# Patient Record
Sex: FEMALE | Race: WHITE | NOT HISPANIC OR LATINO | Employment: FULL TIME | URBAN - METROPOLITAN AREA
[De-identification: names, ages, dates, MRNs, and addresses within clinical notes are randomized per-mention and may not be internally consistent; named-entity substitution may affect disease eponyms.]

---

## 2019-02-08 ENCOUNTER — TELEPHONE (OUTPATIENT)
Dept: HEALTH INFORMATION MANAGEMENT | Facility: OTHER | Age: 39
End: 2019-02-08

## 2019-02-14 ENCOUNTER — OFFICE VISIT (OUTPATIENT)
Dept: MEDICAL GROUP | Facility: MEDICAL CENTER | Age: 39
End: 2019-02-14

## 2019-02-14 VITALS
TEMPERATURE: 98.3 F | WEIGHT: 138 LBS | BODY MASS INDEX: 22.99 KG/M2 | SYSTOLIC BLOOD PRESSURE: 118 MMHG | OXYGEN SATURATION: 98 % | HEIGHT: 65 IN | HEART RATE: 78 BPM | DIASTOLIC BLOOD PRESSURE: 76 MMHG | RESPIRATION RATE: 16 BRPM

## 2019-02-14 DIAGNOSIS — Z12.31 SCREENING MAMMOGRAM, ENCOUNTER FOR: ICD-10-CM

## 2019-02-14 DIAGNOSIS — Z23 NEED FOR INFLUENZA VACCINATION: ICD-10-CM

## 2019-02-14 DIAGNOSIS — Z00.00 ANNUAL PHYSICAL EXAM: ICD-10-CM

## 2019-02-14 PROBLEM — Z86.19 HISTORY OF HPV INFECTION: Status: ACTIVE | Noted: 2019-02-14

## 2019-02-14 PROCEDURE — 90471 IMMUNIZATION ADMIN: CPT | Performed by: NURSE PRACTITIONER

## 2019-02-14 PROCEDURE — 90686 IIV4 VACC NO PRSV 0.5 ML IM: CPT | Performed by: NURSE PRACTITIONER

## 2019-02-14 PROCEDURE — 99386 PREV VISIT NEW AGE 40-64: CPT | Mod: 25 | Performed by: NURSE PRACTITIONER

## 2019-02-14 ASSESSMENT — PATIENT HEALTH QUESTIONNAIRE - PHQ9: CLINICAL INTERPRETATION OF PHQ2 SCORE: 0

## 2019-02-14 NOTE — ASSESSMENT & PLAN NOTE
Social/Family: , 4 children  Work: , full time  Diet: Vegetables daily, rare red meat. Lean meats. Rare fish. Moderate carbs. Sugary sweets daily.   Caffeine/Energy Drinks/Soda: Coffee 3 cups daily, no soda or energy drinks  Exercise: Runs 3 times per week, resistance  Stress: Normal life stress  Sleep: No concerns  Depression/Anxiety Concerns: Not currently

## 2019-02-15 NOTE — PROGRESS NOTES
"Deep Nguyen is a 38 y.o. female here to establish care and for annual physical:    HPI:   Annual physical exam  Social/Family: , 4 children  Work: , full time  Diet: Vegetables daily, rare red meat. Lean meats. Rare fish. Moderate carbs. Sugary sweets daily.   Caffeine/Energy Drinks/Soda: Coffee 3 cups daily, no soda or energy drinks  Exercise: Runs 3 times per week, resistance  Stress: Normal life stress  Sleep: No concerns  Depression/Anxiety Concerns: Not currently      Current medicines (including changes today)  No current outpatient prescriptions on file.     No current facility-administered medications for this visit.      She  has no past medical history on file.  She  has a past surgical history that includes acl repair.  Social History   Substance Use Topics   • Smoking status: Never Smoker   • Smokeless tobacco: Never Used   • Alcohol use Yes      Comment: wine and beer, 1-2 drinks 5 nights per week     Social History     Social History Narrative   • No narrative on file     Family History   Problem Relation Age of Onset   • Hyperlipidemia Mother    • Alcohol/Drug Father    • No Known Problems Sister      Family Status   Relation Status   • Mo Alive   • Fa    • Sis Alive         ROS  No chest pain, no abdominal pain, no rash.  Positive ROS as per HPI.  All other systems reviewed and are negative      Objective:     Blood pressure 118/76, pulse 78, temperature 36.8 °C (98.3 °F), temperature source Temporal, resp. rate 16, height 1.651 m (5' 5\"), weight 62.6 kg (138 lb), last menstrual period 02/10/2019, SpO2 98 %. Body mass index is 22.96 kg/m².     Physical Exam:    Constitutional: Alert, no distress.  Skin: Warm, dry, good turgor, no rashes in visible areas.  Eye: Equal, round and reactive, conjunctiva clear, lids normal.  ENMT: Lips without lesions, good dentition, oropharynx clear.  Neck: Trachea midline, no masses, no thyromegaly. No cervical or supraclavicular " lymphadenopathy.  Respiratory: Unlabored respiratory effort, lungs clear to auscultation, no wheezes, no ronchi.  Cardiovascular: Normal S1, S2, no murmur, no edema.  Psych: Alert and oriented x3, normal affect and mood.      Assessment and Plan:   The following treatment plan was discussed    1. Annual physical exam  Patient and I discussed the importance of lifestyle changes, with particular emphasis on decreasing sugar and carbohydrate intake and increasing plant-based nutrition (for the purposes of weight loss, general health, and prevention of chronic illnesses), as well as regular cardiovascular exercise, proper sleep, and stress management. Patient verbalized understanding.  Check labs, call with results  - CBC WITH DIFFERENTIAL; Future  - Comp Metabolic Panel; Future  - Lipid Profile; Future  - TSH WITH REFLEX TO FT4; Future  - VITAMIN D,25 HYDROXY; Future    2. Need for influenza vaccination  - Influenza Vaccine Quad Injection >3Y (PF)    3. Screening mammogram, encounter for  - MA-SCREEN MAMMO W/CAD-BILAT; Future      Followup: Return in about 3 months (around 5/14/2019) for Pap Smear, Review Labs.    I have placed the below orders and discussed them with an approved delegating provider. The MA is performing the below orders under the direction of Dr. Lockwood

## 2019-07-31 ENCOUNTER — HOSPITAL ENCOUNTER (OUTPATIENT)
Dept: RADIOLOGY | Facility: MEDICAL CENTER | Age: 39
End: 2019-07-31
Attending: NURSE PRACTITIONER
Payer: COMMERCIAL

## 2019-07-31 DIAGNOSIS — Z12.31 SCREENING MAMMOGRAM, ENCOUNTER FOR: ICD-10-CM

## 2019-08-05 ENCOUNTER — HOSPITAL ENCOUNTER (OUTPATIENT)
Dept: RADIOLOGY | Facility: MEDICAL CENTER | Age: 39
End: 2019-08-05
Attending: NURSE PRACTITIONER
Payer: COMMERCIAL

## 2019-08-05 DIAGNOSIS — N64.4 BREAST PAIN: ICD-10-CM

## 2019-08-05 PROCEDURE — 76642 ULTRASOUND BREAST LIMITED: CPT | Mod: RT

## 2019-08-05 PROCEDURE — 77066 DX MAMMO INCL CAD BI: CPT

## 2019-08-30 ENCOUNTER — HOSPITAL ENCOUNTER (OUTPATIENT)
Facility: MEDICAL CENTER | Age: 39
End: 2019-08-30
Attending: NURSE PRACTITIONER
Payer: COMMERCIAL

## 2019-08-30 ENCOUNTER — OFFICE VISIT (OUTPATIENT)
Dept: MEDICAL GROUP | Facility: MEDICAL CENTER | Age: 39
End: 2019-08-30
Payer: COMMERCIAL

## 2019-08-30 VITALS
HEART RATE: 102 BPM | HEIGHT: 65 IN | DIASTOLIC BLOOD PRESSURE: 72 MMHG | OXYGEN SATURATION: 96 % | TEMPERATURE: 98.4 F | WEIGHT: 133 LBS | BODY MASS INDEX: 22.16 KG/M2 | SYSTOLIC BLOOD PRESSURE: 110 MMHG

## 2019-08-30 DIAGNOSIS — Z86.19 HISTORY OF HPV INFECTION: ICD-10-CM

## 2019-08-30 DIAGNOSIS — Z30.011 ENCOUNTER FOR INITIAL PRESCRIPTION OF CONTRACEPTIVE PILLS: ICD-10-CM

## 2019-08-30 DIAGNOSIS — Z12.4 PAP SMEAR FOR CERVICAL CANCER SCREENING: ICD-10-CM

## 2019-08-30 PROCEDURE — 88175 CYTOPATH C/V AUTO FLUID REDO: CPT

## 2019-08-30 PROCEDURE — 87624 HPV HI-RISK TYP POOLED RSLT: CPT

## 2019-08-30 PROCEDURE — 99396 PREV VISIT EST AGE 40-64: CPT | Performed by: NURSE PRACTITIONER

## 2019-08-30 RX ORDER — LEVONORGESTREL AND ETHINYL ESTRADIOL 0.1-0.02MG
1 KIT ORAL DAILY
Qty: 28 TAB | Refills: 11 | Status: SHIPPED | OUTPATIENT
Start: 2019-08-30 | End: 2021-03-05 | Stop reason: SDUPTHER

## 2019-08-30 NOTE — PROGRESS NOTES
SUBJECTIVE: 39 y.o. female for annual routine gynecologic exam  Chief Complaint   Patient presents with   • Annual Exam       OB History    Para Term  AB Living   5 4 0 0 1 0   SAB TAB Ectopic Molar Multiple Live Births   1 0 0 0 0 0      Last Pap: 4 years ago  Social History     Substance and Sexual Activity   Sexual Activity Yes   • Partners: Male     Sexual history: currently sexually active, single partner, contraceptive--none   H/O Abnormal Pap yes, last pap HPV +  She  reports that she has never smoked. She has never used smokeless tobacco.        Allergies: Patient has no known allergies.     ROS:    Reports no menopause symptoms of hot flashes, night sweats, sleep disruption, mood changes.Denies vaginal dryness.   Menses every month with 3-7 days light, heavy bleeding   Cramping is no.   She does not take OTC analgesics for cramping  No significant bloating/fluid retention, pelvic pain, or dyspareunia. No vaginal discharge   No breast tenderness, dimpling, mass, nipple discharge, skin changes, changes in size or contour, or abnormal cyclic discomfort.  No urinary tract symptoms, no incontinence, no polydipsia, polyuria,  No abdominal pain, change in bowel habits, black or bloody stools.    No unusual headaches, no visual changes, menstrual migraines   No prolonged cough. No dyspnea or chest pain on exertion.  No depression, labile mood, anxiety, libido changes, insomnia.  No temperature intolerance.  No new/concerning skin lesions, concerns.     Exercise: moderate regular exercise program  Preventive Care:Up to date, labs due    Current medicines (including changes today)  No current outpatient medications on file.     No current facility-administered medications for this visit.      She  has no past medical history on file.  She  has a past surgical history that includes acl repair.     Family History:   Family History   Problem Relation Age of Onset   • Hyperlipidemia Mother    • Alcohol/Drug  "Father    • No Known Problems Sister        OBJECTIVE:   /72 (BP Location: Right arm, Patient Position: Sitting, BP Cuff Size: Adult)   Pulse (!) 102   Temp 36.9 °C (98.4 °F) (Temporal)   Ht 1.651 m (5' 5\")   Wt 60.3 kg (133 lb)   SpO2 96%   BMI 22.13 kg/m²   Body mass index is 22.13 kg/m².    General/Constitutional: Vitals as above, Well nourished, well developed female in no acute distress  HEAD AND NECK:  Ears normal.  Throat, oral cavity and tongue normal.  Neck supple. No adenopathy or masses in the neck or supraclavicular regions.  No carotid bruits. No thyromegaly.   PSYCH: Judgment grossly appropriate, no apparent depression/anxiety  NEURO: Cranial nerves are normal. DTR's normal and symmetric.    CHEST:  Clear, good air entry, no wheezes or rales.   HEART:  Regular rate and rhythm.  S1 and S2 normal.  No edema or JVD. ABDOMEN:  Soft without tenderness, guarding, mass or organomegaly.  No CVA tenderness or inguinal adenopathy.   Genitourinary: Grossly normal external female genitalia with skin within normal limits,   EXTREMITIES:  Extremities, reflexes and peripheral pulses are normal.   SKIN: color normal, vascularity normal, no edema, temperature normal   No rashes or suspicious skin lesions noted.     Breast Exam: deferred, recent mammogram done.   Pelvic Exam -  Normal external genitalia with no lesions. Vaginal Mucosa:  normal vaginal mucosa, normal discharge . no adnexal masses or tenderness, no cervical motion tenderness, no uterine tenderness, small speculum used with warm water as lubrication, Cervix well visualized with no discharge/friability/bleeding, cervical broom used to obtain cervical specimen, post-specimen collection demonstrated no bleeding, Cervix with no visible lesions. Thin Prep Pap is obtained, vaginal swab is not obtained and specimen(s) sent to lab  Rectal: deferred    <ASSESSMENT and PLAN>  1. Pap smear for cervical cancer screening  Pap complete, call with results "   2. Encounter for initial prescription of contraceptive pills   OCP sent to pharmacy.  levonorgestrel-ethinyl estradiol (AVIANE, MARCI, LESSINA) 0.1-20 MG-MCG per tablet   3. History of HPV infection         Discussed  mammography screening, use and side effects of OCP's, family planning choices, diet and exercise   Follow-up in 5 years for next Gyn exam and 5 years for next Pap, unless abnormal.   Next office visit for recheck of chronic medical conditions is due in  1 year

## 2019-08-30 NOTE — ASSESSMENT & PLAN NOTE
Patient is currently not using any preventative measures to prevent pregnancy.  She was previously on oral contraceptives but has not been since she had her last child about 4 years ago.  She would like to restart OCP today to prevent further pregnancies.  She denies any personal or family history of breast cancer, blood clots or clotting disorders, migraine headaches. She is a non smoker.

## 2019-09-03 LAB
CYTOLOGY REG CYTOL: NORMAL
HPV HR 12 DNA CVX QL NAA+PROBE: NEGATIVE
HPV16 DNA SPEC QL NAA+PROBE: NEGATIVE
HPV18 DNA SPEC QL NAA+PROBE: NEGATIVE
SPECIMEN SOURCE: NORMAL

## 2019-09-10 ENCOUNTER — TELEPHONE (OUTPATIENT)
Dept: MEDICAL GROUP | Facility: MEDICAL CENTER | Age: 39
End: 2019-09-10

## 2019-09-10 NOTE — TELEPHONE ENCOUNTER
----- Message from LAURA Bhagat sent at 9/5/2019  1:34 PM PDT -----  Please notify patient that the Pap smear was normal. Next pap smear is due in 5 years.    LAURA Bhagat

## 2021-02-24 ENCOUNTER — PATIENT OUTREACH (OUTPATIENT)
Dept: MEDICAL GROUP | Facility: MEDICAL CENTER | Age: 41
End: 2021-02-24

## 2021-02-24 NOTE — PROGRESS NOTES
ANNUAL WELLNESS VISIT PRE-VISIT PLANNING    1.  Reviewed notes from the last office visit: Yes    2.  If any orders were ordered or intended to be done prior to visit (i.e. 6 mos follow-up), do we have results/consult notes or has patient scheduled?        •  Labs - Labs ordered, completed on 08/30/2019 and results are in chart.  Note: If patient appointment is for lab review and patient did not complete labs, check with provider if OK to reschedule patient until labs completed.       •  Imaging - Imaging ordered, completed and results are in chart.       •  Referrals - No referrals were ordered at last office visit.    3.  Immunizations were updated in Epic using Reconcile Outside Information activity? Yes       •  Is patient due for Tdap? YES. Patient was not notified of copay/out of pocket cost.       •  Is patient due for Shingrix? NO    4.  Patient is due for the following Health Maintenance Topics:   Health Maintenance Due   Topic Date Due   • IMM DTaP/Tdap/Td Vaccine (2 - Td) 05/30/2020   • MAMMOGRAM  08/05/2020   • IMM INFLUENZA (1) 09/01/2020     5.  Reviewed/Updated the following:       •   Preferred Pharmacy? Yes       •   Preferred Lab? Yes       •   Preferred Communication? Yes       •   Allergies? Yes       •   Medications? YES. Was Abstract Encounter opened and chart updated? YES       •   Social History? Yes       •   Family History (document living status of immediate family members and if + hx of  cancer, diabetes, hypertension, hyperlipidemia, heart attack, stroke) No    6.  Care Team Updated:       •   DME Company (gait device, O2, CPAP, etc.): N\A       •   Other Specialists (eye doctor, derm, GYN, cardiology, endo, etc): N\A    7.  Patient was not advised: “This is a free wellness visit. The provider will screen for medical conditions to help you stay healthy. If you have other concerns to address you may be asked to discuss these at a separate visit or there may be an additional fee.”     8.   AHA (Puls8) form printed for Provider? N/A       Patient NOT contacted to complete AWV PVP. Information reviewed in chart.   Outside information NOT reconciled using the From The Bench feature. Per Katarzyna Solano, the From The Bench feature is down as of 02/09/2021 at 2:00pm. Will reconcile outside information at a later date.

## 2021-03-01 ENCOUNTER — APPOINTMENT (OUTPATIENT)
Dept: URGENT CARE | Facility: CLINIC | Age: 41
End: 2021-03-01
Payer: COMMERCIAL

## 2021-03-01 ENCOUNTER — HOSPITAL ENCOUNTER (EMERGENCY)
Facility: MEDICAL CENTER | Age: 41
End: 2021-03-01
Attending: EMERGENCY MEDICINE
Payer: COMMERCIAL

## 2021-03-01 ENCOUNTER — APPOINTMENT (OUTPATIENT)
Dept: RADIOLOGY | Facility: MEDICAL CENTER | Age: 41
End: 2021-03-01
Attending: EMERGENCY MEDICINE
Payer: COMMERCIAL

## 2021-03-01 VITALS
WEIGHT: 144.62 LBS | TEMPERATURE: 98.5 F | OXYGEN SATURATION: 97 % | SYSTOLIC BLOOD PRESSURE: 143 MMHG | HEIGHT: 65 IN | DIASTOLIC BLOOD PRESSURE: 91 MMHG | HEART RATE: 72 BPM | BODY MASS INDEX: 24.1 KG/M2 | RESPIRATION RATE: 16 BRPM

## 2021-03-01 DIAGNOSIS — O03.9 SPONTANEOUS MISCARRIAGE: ICD-10-CM

## 2021-03-01 DIAGNOSIS — R19.7 DIARRHEA, UNSPECIFIED TYPE: ICD-10-CM

## 2021-03-01 LAB
ALBUMIN SERPL BCP-MCNC: 4.5 G/DL (ref 3.2–4.9)
ALBUMIN/GLOB SERPL: 1.6 G/DL
ALP SERPL-CCNC: 76 U/L (ref 30–99)
ALT SERPL-CCNC: 12 U/L (ref 2–50)
ANION GAP SERPL CALC-SCNC: 9 MMOL/L (ref 7–16)
APPEARANCE UR: CLEAR
AST SERPL-CCNC: 18 U/L (ref 12–45)
B-HCG SERPL-ACNC: 40.7 MIU/ML (ref 0–10)
BACTERIA #/AREA URNS HPF: ABNORMAL /HPF
BASOPHILS # BLD AUTO: 0.4 % (ref 0–1.8)
BASOPHILS # BLD: 0.03 K/UL (ref 0–0.12)
BILIRUB SERPL-MCNC: 0.3 MG/DL (ref 0.1–1.5)
BILIRUB UR QL STRIP.AUTO: NEGATIVE
BUN SERPL-MCNC: 10 MG/DL (ref 8–22)
CALCIUM SERPL-MCNC: 9.5 MG/DL (ref 8.4–10.2)
CHLORIDE SERPL-SCNC: 105 MMOL/L (ref 96–112)
CO2 SERPL-SCNC: 25 MMOL/L (ref 20–33)
COLOR UR: YELLOW
CREAT SERPL-MCNC: 0.81 MG/DL (ref 0.5–1.4)
EOSINOPHIL # BLD AUTO: 0.05 K/UL (ref 0–0.51)
EOSINOPHIL NFR BLD: 0.7 % (ref 0–6.9)
EPI CELLS #/AREA URNS HPF: ABNORMAL /HPF
ERYTHROCYTE [DISTWIDTH] IN BLOOD BY AUTOMATED COUNT: 40.4 FL (ref 35.9–50)
GLOBULIN SER CALC-MCNC: 2.9 G/DL (ref 1.9–3.5)
GLUCOSE SERPL-MCNC: 97 MG/DL (ref 65–99)
GLUCOSE UR STRIP.AUTO-MCNC: NEGATIVE MG/DL
HCG SERPL QL: POSITIVE
HCT VFR BLD AUTO: 40.3 % (ref 37–47)
HGB BLD-MCNC: 14 G/DL (ref 12–16)
IMM GRANULOCYTES # BLD AUTO: 0.02 K/UL (ref 0–0.11)
IMM GRANULOCYTES NFR BLD AUTO: 0.3 % (ref 0–0.9)
KETONES UR STRIP.AUTO-MCNC: NEGATIVE MG/DL
LEUKOCYTE ESTERASE UR QL STRIP.AUTO: NEGATIVE
LIPASE SERPL-CCNC: 26 U/L (ref 7–58)
LYMPHOCYTES # BLD AUTO: 1.88 K/UL (ref 1–4.8)
LYMPHOCYTES NFR BLD: 27.6 % (ref 22–41)
MAGNESIUM SERPL-MCNC: 2.1 MG/DL (ref 1.5–2.5)
MCH RBC QN AUTO: 32.7 PG (ref 27–33)
MCHC RBC AUTO-ENTMCNC: 34.7 G/DL (ref 33.6–35)
MCV RBC AUTO: 94.2 FL (ref 81.4–97.8)
MICRO URNS: ABNORMAL
MONOCYTES # BLD AUTO: 0.43 K/UL (ref 0–0.85)
MONOCYTES NFR BLD AUTO: 6.3 % (ref 0–13.4)
MUCOUS THREADS #/AREA URNS HPF: ABNORMAL /HPF
NEUTROPHILS # BLD AUTO: 4.41 K/UL (ref 2–7.15)
NEUTROPHILS NFR BLD: 64.7 % (ref 44–72)
NITRITE UR QL STRIP.AUTO: NEGATIVE
NRBC # BLD AUTO: 0 K/UL
NRBC BLD-RTO: 0 /100 WBC
NUMBER OF RH DOSES IND 8505RD: NORMAL
PH UR STRIP.AUTO: 5.5 [PH] (ref 5–8)
PLATELET # BLD AUTO: 288 K/UL (ref 164–446)
PMV BLD AUTO: 9.8 FL (ref 9–12.9)
POTASSIUM SERPL-SCNC: 3.8 MMOL/L (ref 3.6–5.5)
PROT SERPL-MCNC: 7.4 G/DL (ref 6–8.2)
PROT UR QL STRIP: NEGATIVE MG/DL
RBC # BLD AUTO: 4.28 M/UL (ref 4.2–5.4)
RBC UR QL AUTO: ABNORMAL
RH BLD: NORMAL
SODIUM SERPL-SCNC: 139 MMOL/L (ref 135–145)
SP GR UR STRIP.AUTO: 1.02
UNIDENT CRYS URNS QL MICRO: ABNORMAL /HPF
WBC # BLD AUTO: 6.8 K/UL (ref 4.8–10.8)
WBC #/AREA URNS HPF: ABNORMAL /HPF

## 2021-03-01 PROCEDURE — 76801 OB US < 14 WKS SINGLE FETUS: CPT

## 2021-03-01 PROCEDURE — A9270 NON-COVERED ITEM OR SERVICE: HCPCS | Performed by: EMERGENCY MEDICINE

## 2021-03-01 PROCEDURE — 84702 CHORIONIC GONADOTROPIN TEST: CPT

## 2021-03-01 PROCEDURE — 81001 URINALYSIS AUTO W/SCOPE: CPT

## 2021-03-01 PROCEDURE — 83735 ASSAY OF MAGNESIUM: CPT

## 2021-03-01 PROCEDURE — 80053 COMPREHEN METABOLIC PANEL: CPT

## 2021-03-01 PROCEDURE — 99284 EMERGENCY DEPT VISIT MOD MDM: CPT

## 2021-03-01 PROCEDURE — 85025 COMPLETE CBC W/AUTO DIFF WBC: CPT

## 2021-03-01 PROCEDURE — 83690 ASSAY OF LIPASE: CPT

## 2021-03-01 PROCEDURE — 700102 HCHG RX REV CODE 250 W/ 637 OVERRIDE(OP): Performed by: EMERGENCY MEDICINE

## 2021-03-01 PROCEDURE — 86901 BLOOD TYPING SEROLOGIC RH(D): CPT

## 2021-03-01 PROCEDURE — 84703 CHORIONIC GONADOTROPIN ASSAY: CPT

## 2021-03-01 PROCEDURE — 36415 COLL VENOUS BLD VENIPUNCTURE: CPT

## 2021-03-01 RX ORDER — DIPHENOXYLATE HYDROCHLORIDE AND ATROPINE SULFATE 2.5; .025 MG/1; MG/1
1 TABLET ORAL ONCE
Status: COMPLETED | OUTPATIENT
Start: 2021-03-01 | End: 2021-03-01

## 2021-03-01 RX ORDER — LOPERAMIDE HYDROCHLORIDE 2 MG/1
2 CAPSULE ORAL 4 TIMES DAILY PRN
Qty: 30 CAPSULE | Refills: 0 | Status: SHIPPED | OUTPATIENT
Start: 2021-03-01

## 2021-03-01 RX ORDER — DICYCLOMINE HCL 20 MG
20 TABLET ORAL 4 TIMES DAILY PRN
Qty: 30 TABLET | Refills: 0 | Status: SHIPPED | OUTPATIENT
Start: 2021-03-01

## 2021-03-01 RX ORDER — DICYCLOMINE HCL 20 MG
20 TABLET ORAL ONCE
Status: COMPLETED | OUTPATIENT
Start: 2021-03-01 | End: 2021-03-01

## 2021-03-01 RX ADMIN — DICYCLOMINE HYDROCHLORIDE 20 MG: 20 TABLET ORAL at 12:20

## 2021-03-01 RX ADMIN — DIPHENOXYLATE HYDROCHLORIDE AND ATROPINE SULFATE 1 TABLET: 2.5; .025 TABLET ORAL at 12:20

## 2021-03-01 NOTE — ED PROVIDER NOTES
"ED Provider Note    ED Provider Note    Primary care provider: LAURA Bhagat  Means of arrival: Walk-in  History obtained from: Patient    CHIEF COMPLAINT  Chief Complaint   Patient presents with   • Abdominal Pain     pt has int. \" twisting \" abd pain Pt feels she may have miscarried 2 wks ago   • Diarrhea   • Bloating     Seen at 12:02 PM.   HPI  Deep Nguyen is a 40 y.o. female who presents to the Emergency Department with 2 weeks of copious diarrhea.  The patient states that the diarrhea initially started dark but over the past few weeks it has been predominantly watery.  She has anywhere between 3 and 6 episodes daily.  She notes associated bloating, abdominal distention, cramping.    She has not taken any over-the-counter antimotility agents or Pepto-Bismol.  She also notes just prior to the onset of the diarrhea she had several days of much heavier vaginal bleeding than she usually has for her menstrual cycle.  She states that her menses was lighter than normal and she was passing multiple clots with her menses which was typical for when she had a spontaneous miscarriage years back.  She is a G5, P4.  She was unaware of being pregnant, therefore this is not confirmed but she thought the bleeding was similar to prior spontaneous AB.    She denies any recent fevers, chills, chest pain, cough, shortness of breath, dysuria, vomiting.  She has had some nausea.  No COVID-19 exposures.  No recent travel outside of the country, no drinking from streams, no recent antibiotic use.  No known exposures to C. Difficile.   The patient initially went to the urgent care but after riding her symptoms down they recommended she go to the emergency department for evaluation.      REVIEW OF SYSTEMS  See HPI,   Remainder of ROS negative.     PAST MEDICAL HISTORY   Denies    SURGICAL HISTORY   has a past surgical history that includes acl repair.    SOCIAL HISTORY  Social History     Tobacco Use   • Smoking status: " "Never Smoker   • Smokeless tobacco: Never Used   Substance Use Topics   • Alcohol use: Yes     Comment: wine and beer, 1-2 drinks 5 nights per week   • Drug use: No      Social History     Substance and Sexual Activity   Drug Use No       FAMILY HISTORY  Family History   Problem Relation Age of Onset   • Hyperlipidemia Mother    • Alcohol/Drug Father    • No Known Problems Sister        CURRENT MEDICATIONS  Reviewed.  See Encounter Summary.     ALLERGIES  No Known Allergies    PHYSICAL EXAM  VITAL SIGNS: /91   Pulse 72   Temp 36.9 °C (98.5 °F) (Temporal)   Resp 16   Ht 1.651 m (5' 5\")   Wt 65.6 kg (144 lb 10 oz)   LMP 2021 (Exact Date)   SpO2 97%   Breastfeeding No   BMI 24.07 kg/m²   Constitutional: Awake, alert in no apparent distress.  HENT: Normocephalic, Bilateral external ears normal. Nose normal.  Mildly dry mucosal membranes.  Eyes: Conjunctiva normal, non-icteric, EOMI.    Thorax & Lungs: Easy unlabored respirations, Clear to ascultation bilaterally.  Cardiovascular: Regular rate, Regular rhythm, No murmurs, rubs or gallops. Bilateral pulses symmetrical.   Abdomen:  Soft, nontender, nondistended, normal active bowel sounds.   :    Skin: Visualized skin is  Dry, No erythema, No rash.   Musculoskeletal:   No cyanosis, clubbing or edema. No leg asymmetry.   Neurologic: Alert, Grossly non-focal.   Psychiatric: Normal affect, Normal mood  Lymphatic:  No cervical LAD      RADIOLOGY  US-OB 1ST TRIMESTER WITH TRANSVAGINAL (COMBO)   Final Result      1.  No intrauterine pregnancy seen. If the patient does have a positive pregnancy test, differential includes early pregnancy, complete , and ectopic pregnancy. Correlation with quantitative beta-hCG and follow-up ultrasound is recommended.      2.  4.5 cm simple left ovarian cyst.               COURSE & MEDICAL DECISION MAKING  Pertinent Labs & Imaging studies reviewed. (See chart for details)    Differential diagnoses include but are not " limited to: Most consistent with AGE.  No risk factors for Salmonella, C. difficile, Giardia.  We will do screening labs to include hCG.  Plan to obtain magnesium level as well as patient with copious diarrhea often will have hypomagnesemia and hypokalemia.    12:02 PM - Medical record reviewed, patient seen and examined at bedside.  No recent ER visits.    Decision Making:  This is a pleasant 40 y.o. year old female who presents with what appears to be 2 separate issues.    Possible miscarriage: 2 weeks ago the patient had a prolonged menstrual cycle and that was lighter than usual and she noted to be similar to when she had a spontaneous miscarriage in the past.  Beta hCG was positive today.  Quantitative hCG only mildly elevated at 40.  The patient is Rh+, no indication for RhoGam.  She currently does not have any abdominal pain and she states the vaginal bleeding resolved yesterday.  She is hemodynamically stable, she does not have any anemia.  Because the quantitative hCG is still positive, transvaginal ultrasound was conducted that does not show any IUP.  This is most likely a completed spontaneous miscarriage with still residual positive hCG.  I explained that there is a slight possibility the patient could have ectopic pregnancy with IUFD, for this reason I recommend a repeat quantitative hCG in about 1 week to ensure that the number returns to 0.  If for some reason she has return of bleeding or severe abdominal pain she should return immediately for reevaluation.    Diarrhea: This appears to be unrelated.  The patient does not have any severe electrolyte derangement.  Potassium is on the low side of normal.  Again she does not have any leukocytosis or leftward shift.  She does not have any risk factors for C. difficile, traveler's diarrhea or parasitic infection.  I do not feel that antibiotics would be beneficial.  I recommend motility agents.  I will place the patient on some Bentyl for cramping and  Imodium for the diarrhea.  This should resolve shortly after the treatment provided.    The patient has follow-up with her primary care physician on Friday which I think is appropriate.  Discharge Medications:  Discharge Medication List as of 3/1/2021  2:49 PM      START taking these medications    Details   loperamide (IMODIUM) 2 MG Cap Take 1 capsule by mouth 4 times a day as needed for Diarrhea., Disp-30 capsule, R-0, Normal      dicyclomine (BENTYL) 20 MG Tab Take 1 tablet by mouth 4 times a day as needed., Disp-30 tablet, R-0, Normal             The patient was discharged home (see d/c instructions) was told to return immediately for any signs or symptoms listed, or any worsening at all.  The patient verbally agreed to the discharge precautions and follow-up plan which is documented in EPIC.    The patient's blood pressure is elevated today. >120/80. I have referred them to primary care for follow up.       FINAL IMPRESSION  1. Diarrhea, unspecified type    2. Spontaneous miscarriage

## 2021-03-01 NOTE — ED NOTES
Pt back to room  Reports symptoms x 2 weeks     denies vomiting, + nausea, denies blood or red in emesis

## 2021-03-01 NOTE — ED NOTES
Med Rec completed per patient   Allergies reviewed  No ORAL antibiotics in last 14 days    Patient states that she does not take any daily medications at this time

## 2021-03-05 ENCOUNTER — OFFICE VISIT (OUTPATIENT)
Dept: MEDICAL GROUP | Facility: MEDICAL CENTER | Age: 41
End: 2021-03-05
Payer: COMMERCIAL

## 2021-03-05 VITALS
TEMPERATURE: 98.3 F | OXYGEN SATURATION: 95 % | SYSTOLIC BLOOD PRESSURE: 120 MMHG | HEART RATE: 66 BPM | WEIGHT: 145 LBS | HEIGHT: 65 IN | BODY MASS INDEX: 24.16 KG/M2 | DIASTOLIC BLOOD PRESSURE: 80 MMHG

## 2021-03-05 DIAGNOSIS — O03.9 MISCARRIAGE: ICD-10-CM

## 2021-03-05 DIAGNOSIS — Z12.31 ENCOUNTER FOR SCREENING MAMMOGRAM FOR BREAST CANCER: ICD-10-CM

## 2021-03-05 DIAGNOSIS — Z30.011 ENCOUNTER FOR INITIAL PRESCRIPTION OF CONTRACEPTIVE PILLS: ICD-10-CM

## 2021-03-05 DIAGNOSIS — Z00.00 ANNUAL PHYSICAL EXAM: ICD-10-CM

## 2021-03-05 DIAGNOSIS — Z23 NEED FOR VACCINATION: ICD-10-CM

## 2021-03-05 PROCEDURE — 90471 IMMUNIZATION ADMIN: CPT | Performed by: NURSE PRACTITIONER

## 2021-03-05 PROCEDURE — 90715 TDAP VACCINE 7 YRS/> IM: CPT | Performed by: NURSE PRACTITIONER

## 2021-03-05 PROCEDURE — 99396 PREV VISIT EST AGE 40-64: CPT | Mod: 25 | Performed by: NURSE PRACTITIONER

## 2021-03-05 RX ORDER — LEVONORGESTREL AND ETHINYL ESTRADIOL 0.1-0.02MG
1 KIT ORAL DAILY
Qty: 84 TABLET | Refills: 3 | Status: SHIPPED | OUTPATIENT
Start: 2021-03-05

## 2021-03-05 ASSESSMENT — PATIENT HEALTH QUESTIONNAIRE - PHQ9
CLINICAL INTERPRETATION OF PHQ2 SCORE: 4
SUM OF ALL RESPONSES TO PHQ QUESTIONS 1-9: 14
5. POOR APPETITE OR OVEREATING: 2 - MORE THAN HALF THE DAYS

## 2021-03-05 ASSESSMENT — FIBROSIS 4 INDEX: FIB4 SCORE: 0.76

## 2021-03-05 NOTE — ASSESSMENT & PLAN NOTE
Began Feb 17th with bleeding, cramping. She was late on her period. On COCP but may not have taken as regularly as she should. Has had a miscarriage in the past and symptoms were similar to this.     Seen in ER on 3/1. Had low positive HCG with US negative for embryo. Advised to follow up with me and check another HCG in 1 week to ensure no ectopic pregnancy concern    She is doing better today, bleeding has stopped. Has been having diarrhea and some abdominal bloating. Using imodium and bentyl for cramping and diarrhea.     No fevers, chills, body aches.

## 2021-03-05 NOTE — LETTER
March 5, 2021       Patient: Deep Nguyen   YOB: 1980   Date of Visit: 3/5/2021         To Whom It May Concern:    Please excuse Deep Nguyen from work from 2/18/2021 until 3/5/2021. She may return to work on 3/8/2021 full duty, no restrictions.     If you have any questions or concerns, please don't hesitate to call [phone]          Sincerely,          Yomaira Vernon A.P.R.N.  Electronically Signed

## 2021-03-05 NOTE — ASSESSMENT & PLAN NOTE
Social/Family: , 4 children  Work: [employer] teacher, full time  Diet: Vegetables daily, rare red meat. Lean meats. Rare fish. Moderate carbs. Sugary sweets daily.   Caffeine/Energy Drinks/Soda: Coffee 3 cups daily, no soda or energy drinks  Exercise: Runs 3 times per week  Stress: Normal life stress  Sleep: Trouble staying asleep, taking melatonin as needed  Depression/Anxiety Concerns: Not currently

## 2021-03-05 NOTE — PROGRESS NOTES
Subjective:   Deep Nguyen is a 40 y.o. female here today for annual     Miscarriage  Began Feb 17th with bleeding, cramping. She was late on her period. On COCP but may not have taken as regularly as she should. Has had a miscarriage in the past and symptoms were similar to this.     Seen in ER on 3/1. Had low positive HCG with US negative for embryo. Advised to follow up with me and check another HCG in 1 week to ensure no ectopic pregnancy concern    She is doing better today, bleeding has stopped. Has been having diarrhea and some abdominal bloating. Using imodium and bentyl for cramping and diarrhea.     No fevers, chills, body aches.     Annual physical exam  Social/Family: , 4 children  Work: [employer] teacher, full time  Diet: Vegetables daily, rare red meat. Lean meats. Rare fish. Moderate carbs. Sugary sweets daily.   Caffeine/Energy Drinks/Soda: Coffee 3 cups daily, no soda or energy drinks  Exercise: Runs 3 times per week  Stress: Normal life stress  Sleep: Trouble staying asleep, taking melatonin as needed  Depression/Anxiety Concerns: Not currently           Current medicines (including changes today)  Current Outpatient Medications   Medication Sig Dispense Refill   • levonorgestrel-ethinyl estradiol (AVIANE) 0.1-20 MG-MCG per tablet Take 1 tablet by mouth every day. 84 tablet 3   • loperamide (IMODIUM) 2 MG Cap Take 1 capsule by mouth 4 times a day as needed for Diarrhea. 30 capsule 0   • dicyclomine (BENTYL) 20 MG Tab Take 1 tablet by mouth 4 times a day as needed. 30 tablet 0     No current facility-administered medications for this visit.     She  has no past medical history on file.    ROS   No chest pain, no shortness of breath, no abdominal pain  Positive ROS as per HPI.  All other systems reviewed and are negative.     Objective:     /80 (BP Location: Right arm, Patient Position: Sitting, BP Cuff Size: Adult)   Pulse 66   Temp 36.8 °C (98.3 °F) (Temporal)   Ht 1.638 m (5'  "4.5\")   Wt 65.8 kg (145 lb)   SpO2 95%  Body mass index is 24.5 kg/m².     Physical Exam:  Constitutional: Alert, no distress.  Skin: Warm, dry, good turgor, no rashes in visible areas.  Eye: Equal, round and reactive, conjunctiva clear, lids normal.  ENMT: Lips without lesions, good dentition, oropharynx clear.  Neck: Trachea midline, no masses, no thyromegaly. No cervical or supraclavicular lymphadenopathy  Respiratory: Unlabored respiratory effort, lungs clear to auscultation, no wheezes, no ronchi.  Cardiovascular: Normal S1, S2, no murmur, no edema.  Abdomen: Soft, + generalized tenderness, no masses, no hepatosplenomegaly.  Psych: Alert and oriented x3, normal affect and mood.        Assessment and Plan:   The following treatment plan was discussed    1. Annual physical exam  Healthy 40 year old female  Check labs, call with results  - Lipid Profile; Future  - TSH WITH REFLEX TO FT4; Future  - VITAMIN D,25 HYDROXY; Future    2. Miscarriage  Stable  Symptoms improving, bleeding stopped  Repeat HCG early next week  Strict ER precautions for worsening symptoms  - HCG QUANTITATIVE; Future    3. Encounter for screening mammogram for breast cancer  - MA-SCREENING MAMMO BILAT W/CAD; Future      4. Encounter for initial prescription of contraceptive pills  Refilled.  getting vasectomy.   - levonorgestrel-ethinyl estradiol (AVIANE) 0.1-20 MG-MCG per tablet; Take 1 tablet by mouth every day.  Dispense: 84 tablet; Refill: 3    5. Need for vaccination  - Tdap =>8yo IM      Followup: Return in about 1 year (around 3/5/2022).    I have placed the below orders and discussed them with an approved delegating provider. The MA is performing the below orders under the direction of Dr. Painting           "

## 2021-05-28 ENCOUNTER — APPOINTMENT (OUTPATIENT)
Dept: RADIOLOGY | Facility: MEDICAL CENTER | Age: 41
End: 2021-05-28
Attending: NURSE PRACTITIONER
Payer: COMMERCIAL

## 2021-12-23 ENCOUNTER — HOSPITAL ENCOUNTER (OUTPATIENT)
Dept: RADIOLOGY | Facility: MEDICAL CENTER | Age: 41
End: 2021-12-23
Attending: NURSE PRACTITIONER
Payer: COMMERCIAL

## 2021-12-23 DIAGNOSIS — Z12.31 ENCOUNTER FOR SCREENING MAMMOGRAM FOR BREAST CANCER: ICD-10-CM

## 2021-12-23 PROCEDURE — 77063 BREAST TOMOSYNTHESIS BI: CPT
